# Patient Record
Sex: MALE | Race: WHITE | NOT HISPANIC OR LATINO | Employment: UNEMPLOYED | URBAN - METROPOLITAN AREA
[De-identification: names, ages, dates, MRNs, and addresses within clinical notes are randomized per-mention and may not be internally consistent; named-entity substitution may affect disease eponyms.]

---

## 2021-02-22 ENCOUNTER — OFFICE VISIT (OUTPATIENT)
Dept: SPORTS MEDICINE | Facility: CLINIC | Age: 61
End: 2021-02-22
Payer: MEDICAID

## 2021-02-22 ENCOUNTER — HOSPITAL ENCOUNTER (OUTPATIENT)
Dept: RADIOLOGY | Facility: HOSPITAL | Age: 61
Discharge: HOME | End: 2021-02-22
Attending: ORTHOPAEDIC SURGERY
Payer: MEDICAID

## 2021-02-22 VITALS
WEIGHT: 170.88 LBS | HEART RATE: 79 BPM | SYSTOLIC BLOOD PRESSURE: 136 MMHG | BODY MASS INDEX: 22.65 KG/M2 | DIASTOLIC BLOOD PRESSURE: 79 MMHG | HEIGHT: 73 IN

## 2021-02-22 DIAGNOSIS — M25.562 CHRONIC PAIN OF LEFT KNEE: ICD-10-CM

## 2021-02-22 DIAGNOSIS — M25.562 LEFT KNEE PAIN, UNSPECIFIED CHRONICITY: Primary | ICD-10-CM

## 2021-02-22 DIAGNOSIS — M17.12 ARTHRITIS OF LEFT KNEE: ICD-10-CM

## 2021-02-22 DIAGNOSIS — M17.11 ARTHRITIS OF RIGHT KNEE: ICD-10-CM

## 2021-02-22 DIAGNOSIS — G89.29 CHRONIC PAIN OF BOTH KNEES: ICD-10-CM

## 2021-02-22 DIAGNOSIS — M25.562 LEFT KNEE PAIN, UNSPECIFIED CHRONICITY: ICD-10-CM

## 2021-02-22 DIAGNOSIS — G89.29 CHRONIC PAIN OF LEFT KNEE: ICD-10-CM

## 2021-02-22 DIAGNOSIS — M25.562 CHRONIC PAIN OF BOTH KNEES: ICD-10-CM

## 2021-02-22 DIAGNOSIS — M21.161 GENU VARUM OF BOTH LOWER EXTREMITIES: ICD-10-CM

## 2021-02-22 DIAGNOSIS — M21.162 GENU VARUM OF BOTH LOWER EXTREMITIES: ICD-10-CM

## 2021-02-22 DIAGNOSIS — M25.561 CHRONIC PAIN OF BOTH KNEES: ICD-10-CM

## 2021-02-22 PROCEDURE — 99204 PR OFFICE/OUTPT VISIT, NEW, LEVL IV, 45-59 MIN: ICD-10-PCS | Mod: S$PBB,,, | Performed by: ORTHOPAEDIC SURGERY

## 2021-02-22 PROCEDURE — 99999 PR PBB SHADOW E&M-NEW PATIENT-LVL IV: CPT | Mod: PBBFAC,,, | Performed by: ORTHOPAEDIC SURGERY

## 2021-02-22 PROCEDURE — 73564 X-RAY EXAM KNEE 4 OR MORE: CPT | Mod: 26,RT,, | Performed by: RADIOLOGY

## 2021-02-22 PROCEDURE — 99204 OFFICE O/P NEW MOD 45 MIN: CPT | Mod: S$PBB,,, | Performed by: ORTHOPAEDIC SURGERY

## 2021-02-22 PROCEDURE — 73564 X-RAY EXAM KNEE 4 OR MORE: CPT | Mod: 26,LT,, | Performed by: RADIOLOGY

## 2021-02-22 PROCEDURE — 73564 PR  X-RAY KNEE 4+ VIEW: ICD-10-PCS | Mod: 26,LT,, | Performed by: RADIOLOGY

## 2021-02-22 PROCEDURE — 73564 X-RAY EXAM KNEE 4 OR MORE: CPT | Mod: TC,50

## 2021-02-22 PROCEDURE — 99999 PR PBB SHADOW E&M-NEW PATIENT-LVL IV: ICD-10-PCS | Mod: PBBFAC,,, | Performed by: ORTHOPAEDIC SURGERY

## 2021-02-22 PROCEDURE — 99204 OFFICE O/P NEW MOD 45 MIN: CPT | Mod: PBBFAC,25 | Performed by: ORTHOPAEDIC SURGERY

## 2021-02-22 RX ORDER — IPRATROPIUM BROMIDE AND ALBUTEROL 20; 100 UG/1; UG/1
1 SPRAY, METERED RESPIRATORY (INHALATION)
COMMUNITY
Start: 2021-01-28

## 2021-02-22 RX ORDER — CETIRIZINE HYDROCHLORIDE 10 MG/1
10 TABLET ORAL
COMMUNITY
Start: 2020-04-24

## 2021-02-22 RX ORDER — PANTOPRAZOLE SODIUM 40 MG/1
40 TABLET, DELAYED RELEASE ORAL
COMMUNITY
Start: 2021-01-28

## 2021-02-22 RX ORDER — ATORVASTATIN CALCIUM 20 MG/1
20 TABLET, FILM COATED ORAL
COMMUNITY
Start: 2021-01-28

## 2021-02-22 RX ORDER — IBUPROFEN 800 MG/1
800 TABLET ORAL
COMMUNITY
Start: 2021-01-28 | End: 2021-02-27

## 2021-02-22 RX ORDER — LISINOPRIL 10 MG/1
10 TABLET ORAL
COMMUNITY
Start: 2020-04-24

## 2021-02-22 RX ORDER — SUCRALFATE 1 G/1
1 TABLET ORAL
COMMUNITY
Start: 2021-01-28

## 2021-03-04 ENCOUNTER — TELEPHONE (OUTPATIENT)
Dept: SPORTS MEDICINE | Facility: CLINIC | Age: 61
End: 2021-03-04

## 2023-01-22 ENCOUNTER — HOSPITAL ENCOUNTER (EMERGENCY)
Facility: HOSPITAL | Age: 63
Discharge: HOME | End: 2023-01-22
Attending: EMERGENCY MEDICINE
Payer: MEDICAID

## 2023-01-22 VITALS
DIASTOLIC BLOOD PRESSURE: 91 MMHG | HEIGHT: 73 IN | OXYGEN SATURATION: 97 % | TEMPERATURE: 99 F | SYSTOLIC BLOOD PRESSURE: 163 MMHG | WEIGHT: 163 LBS | BODY MASS INDEX: 21.6 KG/M2 | RESPIRATION RATE: 18 BRPM | HEART RATE: 86 BPM

## 2023-01-22 DIAGNOSIS — R07.89 TIGHTNESS IN CHEST: ICD-10-CM

## 2023-01-22 DIAGNOSIS — J44.1 COPD EXACERBATION: Primary | ICD-10-CM

## 2023-01-22 DIAGNOSIS — R05.9 COUGH: ICD-10-CM

## 2023-01-22 LAB
GROUP A STREP, MOLECULAR: NEGATIVE
INFLUENZA A, MOLECULAR: NEGATIVE
INFLUENZA B, MOLECULAR: NEGATIVE
SARS-COV-2 RDRP RESP QL NAA+PROBE: NEGATIVE
SPECIMEN SOURCE: NORMAL

## 2023-01-22 PROCEDURE — 93010 ELECTROCARDIOGRAM REPORT: CPT | Mod: ,,, | Performed by: SPECIALIST

## 2023-01-22 PROCEDURE — 93010 EKG 12-LEAD: ICD-10-PCS | Mod: ,,, | Performed by: SPECIALIST

## 2023-01-22 PROCEDURE — 99900035 HC TECH TIME PER 15 MIN (STAT)

## 2023-01-22 PROCEDURE — 25000242 PHARM REV CODE 250 ALT 637 W/ HCPCS: Performed by: EMERGENCY MEDICINE

## 2023-01-22 PROCEDURE — 87651 STREP A DNA AMP PROBE: CPT | Performed by: EMERGENCY MEDICINE

## 2023-01-22 PROCEDURE — 94640 AIRWAY INHALATION TREATMENT: CPT

## 2023-01-22 PROCEDURE — 99284 EMERGENCY DEPT VISIT MOD MDM: CPT | Mod: 25

## 2023-01-22 PROCEDURE — U0002 COVID-19 LAB TEST NON-CDC: HCPCS | Performed by: EMERGENCY MEDICINE

## 2023-01-22 PROCEDURE — 87502 INFLUENZA DNA AMP PROBE: CPT | Performed by: EMERGENCY MEDICINE

## 2023-01-22 PROCEDURE — 93005 ELECTROCARDIOGRAM TRACING: CPT | Performed by: SPECIALIST

## 2023-01-22 RX ORDER — ALBUTEROL SULFATE 90 UG/1
2 AEROSOL, METERED RESPIRATORY (INHALATION) EVERY 6 HOURS PRN
Qty: 18 G | Refills: 0 | Status: SHIPPED | OUTPATIENT
Start: 2023-01-22 | End: 2024-01-22

## 2023-01-22 RX ORDER — IPRATROPIUM BROMIDE AND ALBUTEROL SULFATE 2.5; .5 MG/3ML; MG/3ML
3 SOLUTION RESPIRATORY (INHALATION)
Status: COMPLETED | OUTPATIENT
Start: 2023-01-22 | End: 2023-01-22

## 2023-01-22 RX ORDER — PREDNISONE 20 MG/1
40 TABLET ORAL DAILY
Qty: 10 TABLET | Refills: 0 | Status: SHIPPED | OUTPATIENT
Start: 2023-01-22 | End: 2023-01-27

## 2023-01-22 RX ADMIN — IPRATROPIUM BROMIDE AND ALBUTEROL SULFATE 3 ML: 2.5; .5 SOLUTION RESPIRATORY (INHALATION) at 10:01

## 2023-01-22 NOTE — ED PROVIDER NOTES
Encounter Date: 1/22/2023       History     Chief Complaint   Patient presents with    Cough     X  1WEEK    Shortness of Breath     Patient presents complaining of cough and shortness of breath.  Patient is a smoker patient has COPD.  Patient had increasing tightness for the last couple days.  Patient is out of his medication.  Patient usually uses an inhaler.    Review of patient's allergies indicates:  No Known Allergies  Past Medical History:   Diagnosis Date    COPD (chronic obstructive pulmonary disease)     GERD (gastroesophageal reflux disease)     Hypertension      No past surgical history on file.  No family history on file.  Social History     Tobacco Use    Smoking status: Light Smoker    Tobacco comments:     3-4 cigarettes a day   Substance Use Topics    Alcohol use: Never     Comment: no alcohol in 3.5 years as of 2/22/21     Review of Systems   All other systems reviewed and are negative.    Physical Exam     Initial Vitals [01/22/23 0936]   BP Pulse Resp Temp SpO2   (!) 163/91 92 18 98.6 °F (37 °C) 97 %      MAP       --         Physical Exam    Nursing note and vitals reviewed.  Constitutional: He appears well-developed and well-nourished. He is not diaphoretic. No distress.   Pleasant, polite.   HENT:   Head: Normocephalic and atraumatic.   Eyes: EOM are normal.   Neck: Neck supple.   Normal range of motion.  Cardiovascular:  Normal rate, regular rhythm, normal heart sounds and intact distal pulses.           Pulmonary/Chest: No respiratory distress.   Patient is diminished bilateral.   Musculoskeletal:      Cervical back: Normal range of motion and neck supple.     Neurological: He is alert.   Skin: Skin is warm and dry.   Psychiatric: He has a normal mood and affect. His behavior is normal. Judgment and thought content normal.       ED Course   Procedures  Labs Reviewed   GROUP A STREP, MOLECULAR   SARS-COV-2 RNA AMPLIFICATION, QUAL   INFLUENZA A AND B ANTIGEN    Narrative:     Specimen  Source->Nasopharyngeal Swab        ECG Results              EKG 12-lead (In process)  Result time 01/22/23 10:12:52      In process by Interface, Lab In [facility] (01/22/23 10:12:52)                   Narrative:    Test Reason : R07.89,    Vent. Rate : 085 BPM     Atrial Rate : 085 BPM     P-R Int : 174 ms          QRS Dur : 100 ms      QT Int : 384 ms       P-R-T Axes : 079 074 076 degrees     QTc Int : 456 ms    Normal sinus rhythm  Possible Left atrial enlargement  LVH ( Sokolow-Dunn , Romhilt-Vera )  Abnormal ECG  No previous ECGs available    Referred By: [email or web address]   SELF           Confirmed By:                                   Imaging Results              X-Ray Chest PA And Lateral (Final result)  Result time 01/22/23 10:02:13      Final result by Britany Rothman MD (01/22/23 10:02:13)                   Narrative:    Reason: COUGH    FINDINGS:  PA and lateral chest without comparisons show normal cardiomediastinal silhouette.    Lungs are hyperexpanded compatible with COPD. There are no infiltrates or pleural effusions. Pulmonary vasculature is normal. There are degenerative changes of the spine.    IMPRESSION:  Hyperexpanded lungs compatible with COPD    Electronically signed by:  Britany Rothman MD  1/22/2023 10:02 AM CST Workstation: [id number]                                     Medications   albuterol-ipratropium 2.5 mg-0.5 mg/3 mL nebulizer solution 3 mL (3 mLs Nebulization Given 1/22/23 1036)     Medical Decision Making:   Initial Assessment:   No apparent distress  Differential Diagnosis:   COPD exacerbation, pneumonia, viral illness  ED Management:  MDM    Patient presents for emergent evaluation of acute shortness of breath that poses a threat to life and/or bodily function.    In the ED patient found to have acute COPD exacerbation.    I ordered labs and personally reviewed them.  Labs significant for negative flu and COVID  I ordered X-rays and personally reviewed them and reviewed the  radiologist interpretation.  Xray significant for no pneumonia  I ordered EKG and personally reviewed it.  EKG significant for regular rate rhythm without ST elevation.        Discharge MDM    Patient was managed in the ED with neb treatment  The response to treatment was improved.  Patient improved and more open after neb treatment.  Patient given prescription for prednisone and albuterol.  Patient was discharged in stable condition.  Detailed return precautions discussed.                           Clinical Impression:   Final diagnoses:  [R05.9] Cough  [R07.89] Tightness in chest  [J44.1] COPD exacerbation (Primary)        ED Disposition Condition    Discharge Stable          ED Prescriptions       Medication Sig Dispense Start Date End Date Auth. Provider    predniSONE (DELTASONE) 20 MG tablet Take 2 tablets (40 mg total) by mouth once daily. for 5 days 10 tablet 1/22/2023 1/27/2023 Eliseo Navas MD    albuterol (VENTOLIN HFA) 90 mcg/actuation inhaler Inhale 2 puffs into the lungs every 6 (six) hours as needed for Wheezing. Rescue 18 g 1/22/2023 1/22/2024 Eliseo Navas MD          Follow-up Information       Follow up With Specialties Details Why Contact Info    [facility]  In 1 week  [address] [address]  [phone]               Eliseo Navas MD  01/22/23 1219